# Patient Record
(demographics unavailable — no encounter records)

---

## 2024-11-17 NOTE — PHYSICAL EXAM

## 2024-11-17 NOTE — PHYSICAL EXAM

## 2024-11-17 NOTE — DISCUSSION/SUMMARY
[EKG obtained to assist in diagnosis and management of assessed problem(s)] : EKG obtained to assist in diagnosis and management of assessed problem(s) [FreeTextEntry1] : In summary  Ms. Huizar is a 79 year - old F is here  for  3 month follow up after establishing care  2/2024. Past health history remarkable for dilated aorta thoracic of 4.3 cm ( last CT w/o contrast 1/2024). and  coronary artery calcification.  In addition last TTE 1/2024: aortic root normal, mild enlargment aorta, In addition Past health history CKD, ( GFR 53, Cre 1.0), HTN, HLD.  OA  BP range at home  118-150 -140 / 80-90 No CP, no SOB, no edema, no palpitations. No dizziness. Notes occasional  LONGO walking stairs. Stays active walking daily, hydrates well, maintains low salt, low cholesterol HH diet BP today:  151/82, HR 54,  EKG: SB 54, no ischemic changes or prologed QTc, but last EKG 5/2024: HR 64 Meds:  Atorvastatin 20 mg hs, Amlodipine 10 mg daily, Losartan 75 mg daily, Atenolol 100 mg  daily.  labwork: 8/3/2024: LDL 74,  HDL 39: no sig change from prior visit.    #HTN -Encouraged the patient to monitor blood pressure at home, keep a log, and report results back to us for evaluation. Based on results, we will adjust the regimen as necessary. -Limit salt intake ( compliant) -Continue to exercise, walks daily -Maintain adequate hydration   # Dilated ascending aorta (4.3 cm asc aorta 1/2024) -BP management goal 120-130 systolic -BP log and phone follow up in 2 weeks -CT Chest w  and w/o contrast annually, ordered for 1/2025 - Encouraged patient to continue healthy exercise and eating habits, focusing on a Mediterranean style of eating and aiming for the recommended 150 minutes per week of moderate physical activity.

## 2024-11-17 NOTE — PHYSICAL EXAM

## 2024-11-17 NOTE — END OF VISIT
[Time Spent: ___ minutes] : I have spent [unfilled] minutes of time on the encounter which excludes teaching and separately reported services. [FreeTextEntry3] : I, Dr. Yael Sigala, personally performed the evaluation and management (E/M) services for this established patient who presents today with (a) new problem(s)/exacerbation of (an) existing condition(s).  That E/M includes conducting the examination, assessing all new/exacerbated conditions, and establishing a new plan of care.  Today, my ACP, was here to observe my evaluation and management services for this new problem/exacerbated condition to be followed going forward.

## 2024-11-17 NOTE — HISTORY OF PRESENT ILLNESS
[FreeTextEntry1] : Ms. Huizar is a 79 year - old F is here  for  3 month follow up after establishing care  2/2024. Past health history remarkable for dilated aorta thoracic of 4.3 cm ( last CT w/o contrast 1/2024). and  coronary artery calcification.  In addition last TTE 1/2024: aortic root normal, mild enlargment aorta, In addition Past health history CKD, ( GFR 53, Cre 1.0), HTN, HLD.  OA  BP range at home  118-150 -140 / 80-90 No CP, no SOB, no edema, no palpitations. No dizziness. Notes occasional  LONGO walking stairs. Stays active walking daily, hydrates well, maintains low salt, low cholesterol HH diet BP today:  151/82, HR 54,  EKG: SB 54, no ischemic changes or prologed QTc, but last EKG 5/2024: HR 64 Meds:  Atorvastatin 20 mg hs, Amlodipine 10 mg daily, Losartan 75 mg daily, Atenolol 100 mg  daily.  labwork: 8/3/2024: LDL 74,  HDL 39: no sig change from prior visit.

## 2024-11-17 NOTE — REVIEW OF SYSTEMS
[Dyspnea on exertion] : dyspnea during exertion [Joint Pain] : joint pain [Negative] : Heme/Lymph [FreeTextEntry6] : mild LONGO [FreeTextEntry7] : heartburn occasional

## 2025-05-16 NOTE — DISCUSSION/SUMMARY
[EKG obtained to assist in diagnosis and management of assessed problem(s)] : EKG obtained to assist in diagnosis and management of assessed problem(s) [FreeTextEntry1] : In summary Ms. Huizar is a 80  year - old F seen today  for  6 month follow up after establishing care  2/2024, accompanied by her daughter, Karen. Sees new PMD: Dr. Sukhi Raza, in [place]  Past health history remarkable for HTN, HPL,  CKD,  OA, dilated aorta thoracic of 4.3 cm ( last CT w/o contrast 1/2024). and  coronary artery calcification.  In addition last TTE 2/2025: Aortic root WNL, Proximal ascending aorta dilation 4.3 cm, AV, MV, TV, PV all normal.  LV systolic function normal. : recommendation for color doppler study in 6 months.  1/2024: aortic root normal, mild enlargement aorta,   BP range at home  118-150 -140 / 80-90 No CP, no SOB, no edema, no palpitations. No dizziness. Notes occasional  LONGO walking stairs. Stays active walking daily, hydrates well, maintains low salt, low cholesterol HH diet BP today: 136/82, HR 60 EKG: SB 54, no ischemic changes  Meds:  Atorvastatin 20 mg hs, Amlodipine 10 mg daily, Losartan 75 mg daily, Atenolol 100 mg  daily.  Hydrates " fair", walks daily, enjoys gardening labwork:  3/27/2025:  on Crestor 20 mg daily, Lipitor d/c due to myalgias Lipid profile Chol 145 HDL37 LDL 85 CMP WNL, GFR 56, CBC WNl 8/3/2024: LDL 74,  HDL 39: no sig change from prior visit.  diagnostics: Last CT scan/ non contrast 2024 as above   #HTN -Encouraged the patient to monitor blood pressure at home, keep a log, and report results back to us for evaluation. Based on results, we will adjust the regimen as necessary. -Limit salt intake ( compliant) -Continue to exercise, walks daily -Maintain adequate hydration   # Dilated ascending aorta (4.3 cm asc aorta 1/2024)/ Echo recently 2025: stable no changes -BP management goal 120-130 systolic -BP log and phone follow up in 2 weeks -CT Chest w  and w/o contrast ordered  - Encouraged patient to continue healthy exercise and eating habits, focusing on a Mediterranean style of eating and aiming for the recommended 150 minutes per week of moderate physical activity.  f/u 6 months

## 2025-05-16 NOTE — HISTORY OF PRESENT ILLNESS
[FreeTextEntry1] : Ms. Huizar is a 80  year - old F seen today  for  6 month follow up after establishing care  2/2024, accompanied by her daughter, Karen. Sees new PMD: Dr. Sukhi Raza, in [place]  Past health history remarkable for HTN, HPL,  CKD,  OA, dilated aorta thoracic of 4.3 cm ( last CT w/o contrast 1/2024). and  coronary artery calcification.  In addition last TTE 2/2025: Aortic root WNL, Proximal ascending aorta dilation 4.3 cm, AV, MV, TV, PV all normal.  LV systolic function normal. : recommendation for color doppler study in 6 months.  1/2024: aortic root normal, mild enlargement aorta,   BP range at home  118-150 -140 / 80-90 No CP, no SOB, no edema, no palpitations. No dizziness. Notes occasional  LONGO walking stairs. Stays active walking daily, hydrates well, maintains low salt, low cholesterol HH diet BP today: 136/82, HR 60 EKG: SB 54, no ischemic changes  Meds:  Atorvastatin 20 mg hs, Amlodipine 10 mg daily, Losartan 75 mg daily, Atenolol 100 mg  daily.  Hydrates " fair", walks daily, enjoys gardening labwork:  3/27/2025:  on Crestor 20 mg daily, Lipitor d/c due to myalgias Lipid profile Chol 145 HDL37 LDL 85 CMP WNL, GFR 56, CBC WNl 8/3/2024: LDL 74,  HDL 39: no sig change from prior visit.  diagnostics: Last CT scan/ non contrast 2024 as above